# Patient Record
Sex: FEMALE | Race: BLACK OR AFRICAN AMERICAN | NOT HISPANIC OR LATINO | URBAN - METROPOLITAN AREA
[De-identification: names, ages, dates, MRNs, and addresses within clinical notes are randomized per-mention and may not be internally consistent; named-entity substitution may affect disease eponyms.]

---

## 2018-08-30 ENCOUNTER — WALK IN (OUTPATIENT)
Dept: URGENT CARE | Age: 18
End: 2018-08-30

## 2018-08-30 VITALS
OXYGEN SATURATION: 98 % | HEART RATE: 86 BPM | SYSTOLIC BLOOD PRESSURE: 120 MMHG | DIASTOLIC BLOOD PRESSURE: 80 MMHG | TEMPERATURE: 98.4 F

## 2018-08-30 DIAGNOSIS — T78.3XXA ANGIOEDEMA OF LIPS, INITIAL ENCOUNTER: Primary | ICD-10-CM

## 2018-08-30 PROCEDURE — 99214 OFFICE O/P EST MOD 30 MIN: CPT | Performed by: FAMILY MEDICINE

## 2018-08-30 RX ORDER — DIPHENHYDRAMINE HCL 25 MG
25 CAPSULE ORAL EVERY 6 HOURS PRN
COMMUNITY
End: 2023-09-20 | Stop reason: ALTCHOICE

## 2018-08-30 RX ORDER — PREDNISONE 50 MG/1
50 TABLET ORAL DAILY
COMMUNITY
End: 2023-09-20 | Stop reason: ALTCHOICE

## 2018-08-30 RX ORDER — CETIRIZINE HYDROCHLORIDE 10 MG/1
10 TABLET ORAL DAILY
Qty: 30 TABLET | Refills: 1 | Status: SHIPPED | OUTPATIENT
Start: 2018-08-30 | End: 2019-02-26

## 2023-07-21 LAB
HBV SURFACE AB SER QL: NEGATIVE
RUBV IGG SERPL IA-ACNC: NORMAL

## 2023-09-11 ENCOUNTER — TELEPHONE (OUTPATIENT)
Dept: MATERNAL FETAL MEDICINE | Age: 23
End: 2023-09-11

## 2023-09-11 DIAGNOSIS — O99.210 OBESITY AFFECTING PREGNANCY, ANTEPARTUM: ICD-10-CM

## 2023-09-11 DIAGNOSIS — O30.002 TWIN GESTATION IN SECOND TRIMESTER, UNSPECIFIED MULTIPLE GESTATION TYPE: Primary | ICD-10-CM

## 2023-09-11 LAB
HIV 1+2 AB+HIV1 P24 AG SERPL QL IA: NONREACTIVE
RPR SER QL: NONREACTIVE

## 2023-09-19 PROBLEM — O99.210 OBESITY AFFECTING PREGNANCY, ANTEPARTUM: Status: ACTIVE | Noted: 2023-09-19

## 2023-09-19 PROBLEM — O09.30 LATE PRENATAL CARE: Status: ACTIVE | Noted: 2023-09-19

## 2023-09-19 PROBLEM — O09.299 HISTORY OF PRE-ECLAMPSIA IN PRIOR PREGNANCY, CURRENTLY PREGNANT: Status: ACTIVE | Noted: 2023-09-19

## 2023-09-19 PROBLEM — Z86.19 HISTORY OF TRICHOMONIASIS: Status: ACTIVE | Noted: 2023-09-19

## 2023-09-19 PROBLEM — O99.019 ANTEPARTUM ANEMIA: Status: ACTIVE | Noted: 2023-09-19

## 2023-09-19 PROBLEM — O30.009 TWIN GESTATION WITH UNKNOWN NUMBER OF PLACENTAS AND AMNIOTIC SACS, ANTEPARTUM: Status: ACTIVE | Noted: 2023-09-19

## 2023-09-20 ENCOUNTER — OFFICE VISIT (OUTPATIENT)
Dept: MATERNAL FETAL MEDICINE | Age: 23
End: 2023-09-20

## 2023-09-20 VITALS
HEART RATE: 95 BPM | WEIGHT: 293 LBS | HEIGHT: 65 IN | DIASTOLIC BLOOD PRESSURE: 84 MMHG | BODY MASS INDEX: 48.82 KG/M2 | SYSTOLIC BLOOD PRESSURE: 133 MMHG | RESPIRATION RATE: 18 BRPM | OXYGEN SATURATION: 99 %

## 2023-09-20 DIAGNOSIS — O09.299 HISTORY OF PRE-ECLAMPSIA IN PRIOR PREGNANCY, CURRENTLY PREGNANT: ICD-10-CM

## 2023-09-20 DIAGNOSIS — O99.210 OBESITY AFFECTING PREGNANCY, ANTEPARTUM: ICD-10-CM

## 2023-09-20 DIAGNOSIS — O30.002 TWIN GESTATION IN SECOND TRIMESTER, UNSPECIFIED MULTIPLE GESTATION TYPE: ICD-10-CM

## 2023-09-20 DIAGNOSIS — O99.019 ANTEPARTUM ANEMIA: ICD-10-CM

## 2023-09-20 DIAGNOSIS — O30.009 TWIN GESTATION WITH UNKNOWN NUMBER OF PLACENTAS AND AMNIOTIC SACS, ANTEPARTUM: ICD-10-CM

## 2023-09-20 DIAGNOSIS — Z36.3 ANTENATAL SCREENING FOR MALFORMATION USING ULTRASONICS: Primary | ICD-10-CM

## 2023-09-20 DIAGNOSIS — Z3A.31 31 WEEKS GESTATION OF PREGNANCY: Primary | ICD-10-CM

## 2023-09-20 LAB
APPEARANCE, POC: CLEAR
BILIRUBIN, POC: ABNORMAL
COLOR, POC: YELLOW
GLUCOSE UR-MCNC: NEGATIVE MG/DL
KETONES, POC: ABNORMAL MG/DL
NITRITE, POC: NEGATIVE
OCCULT BLOOD, POC: NEGATIVE
PH UR: 7 [PH] (ref 5–7)
PROT UR-MCNC: ABNORMAL MG/DL
SP GR UR: 1.02 (ref 1–1.03)
UROBILINOGEN UR-MCNC: 0.2 MG/DL (ref 0–1)
WBC (LEUKOCYTE) ESTERASE, POC: ABNORMAL

## 2023-09-20 PROCEDURE — 81002 URINALYSIS NONAUTO W/O SCOPE: CPT | Performed by: OBSTETRICS & GYNECOLOGY

## 2023-09-20 PROCEDURE — 76812 OB US DETAILED ADDL FETUS: CPT | Performed by: OBSTETRICS & GYNECOLOGY

## 2023-09-20 PROCEDURE — 99244 OFF/OP CNSLTJ NEW/EST MOD 40: CPT | Performed by: OBSTETRICS & GYNECOLOGY

## 2023-09-20 PROCEDURE — 76811 OB US DETAILED SNGL FETUS: CPT | Performed by: OBSTETRICS & GYNECOLOGY

## 2023-09-20 RX ORDER — FERROUS SULFATE 325(65) MG
1 TABLET ORAL
COMMUNITY
Start: 2023-09-05

## 2023-09-20 ASSESSMENT — PAIN SCALES - GENERAL: PAINLEVEL: 0

## 2023-09-26 ENCOUNTER — TELEPHONE (OUTPATIENT)
Dept: MATERNAL FETAL MEDICINE | Age: 23
End: 2023-09-26

## 2023-09-26 DIAGNOSIS — O99.210 OBESITY AFFECTING PREGNANCY, ANTEPARTUM: ICD-10-CM

## 2023-09-26 DIAGNOSIS — O30.049 DICHORIONIC DIAMNIOTIC TWIN PREGNANCY, ANTEPARTUM: Primary | ICD-10-CM

## 2023-10-11 ENCOUNTER — TELEPHONE (OUTPATIENT)
Dept: MATERNAL FETAL MEDICINE | Age: 23
End: 2023-10-11

## 2023-10-11 ENCOUNTER — TELEPHONE (OUTPATIENT)
Dept: HEMATOLOGY/ONCOLOGY | Age: 23
End: 2023-10-11

## 2023-10-11 DIAGNOSIS — E66.9 OBESITY: Primary | ICD-10-CM

## 2023-10-11 PROBLEM — O99.013 ANEMIA IN PREGNANCY, THIRD TRIMESTER: Status: ACTIVE | Noted: 2023-09-19

## 2023-10-12 ENCOUNTER — TELEPHONE (OUTPATIENT)
Dept: MATERNAL FETAL MEDICINE | Age: 23
End: 2023-10-12

## 2023-10-27 ENCOUNTER — E-ADVICE (OUTPATIENT)
Dept: MATERNAL FETAL MEDICINE | Age: 23
End: 2023-10-27

## 2023-10-30 ENCOUNTER — TELEPHONE (OUTPATIENT)
Dept: MATERNAL FETAL MEDICINE | Age: 23
End: 2023-10-30

## 2023-11-08 ENCOUNTER — HOSPITAL ENCOUNTER (INPATIENT)
Age: 23
LOS: 3 days | Discharge: HOME | End: 2023-11-11
Attending: OBSTETRICS & GYNECOLOGY | Admitting: OBSTETRICS & GYNECOLOGY

## 2023-11-08 ENCOUNTER — APPOINTMENT (OUTPATIENT)
Dept: ULTRASOUND IMAGING | Age: 23
End: 2023-11-08
Attending: OBSTETRICS & GYNECOLOGY

## 2023-11-08 ENCOUNTER — ANESTHESIA EVENT (OUTPATIENT)
Dept: OBGYN | Age: 23
End: 2023-11-08

## 2023-11-08 ENCOUNTER — ANESTHESIA (OUTPATIENT)
Dept: OBGYN | Age: 23
End: 2023-11-08

## 2023-11-08 DIAGNOSIS — Z3A.38 38 WEEKS GESTATION OF PREGNANCY: ICD-10-CM

## 2023-11-08 LAB
ABO + RH BLD: NORMAL
ALBUMIN SERPL-MCNC: 2.5 G/DL (ref 3.6–5.1)
ALBUMIN/GLOB SERPL: 0.5 {RATIO} (ref 1–2.4)
ALP SERPL-CCNC: 511 UNITS/L (ref 45–117)
ALT SERPL-CCNC: 8 UNITS/L
ANION GAP SERPL CALC-SCNC: 15 MMOL/L (ref 7–19)
AST SERPL-CCNC: 16 UNITS/L
BASOPHILS # BLD: 0 K/MCL (ref 0–0.3)
BASOPHILS NFR BLD: 0 %
BILIRUB SERPL-MCNC: 0.5 MG/DL (ref 0.2–1)
BLD GP AB SCN SERPL QL GEL: POSITIVE
BUN SERPL-MCNC: 6 MG/DL (ref 6–20)
BUN/CREAT SERPL: 10 (ref 7–25)
CALCIUM SERPL-MCNC: 8.8 MG/DL (ref 8.4–10.2)
CHLORIDE SERPL-SCNC: 102 MMOL/L (ref 97–110)
CO2 SERPL-SCNC: 25 MMOL/L (ref 21–32)
CREAT SERPL-MCNC: 0.63 MG/DL (ref 0.51–0.95)
DEPRECATED RDW RBC: 44.1 FL (ref 39–50)
EGFRCR SERPLBLD CKD-EPI 2021: >90 ML/MIN/{1.73_M2}
EOSINOPHIL # BLD: 0.2 K/MCL (ref 0–0.5)
EOSINOPHIL NFR BLD: 1 %
ERYTHROCYTE [DISTWIDTH] IN BLOOD: 15.2 % (ref 11–15)
FASTING DURATION TIME PATIENT: ABNORMAL H
GLOBULIN SER-MCNC: 4.6 G/DL (ref 2–4)
GLUCOSE SERPL-MCNC: 94 MG/DL (ref 70–99)
HCT VFR BLD CALC: 32.3 % (ref 36–46.5)
HGB BLD-MCNC: 10.1 G/DL (ref 12–15.5)
HIV 1+2 AB+HIV1P24 AG SERPLBLD IA.RAPID: NONREACTIVE
IMM GRANULOCYTES # BLD AUTO: 0.1 K/MCL (ref 0–0.2)
IMM GRANULOCYTES # BLD: 1 %
LDH SERPL L TO P-CCNC: 284 UNITS/L (ref 82–240)
LYMPHOCYTES # BLD: 3.5 K/MCL (ref 1–4.8)
LYMPHOCYTES NFR BLD: 24 %
MCH RBC QN AUTO: 25.3 PG (ref 26–34)
MCHC RBC AUTO-ENTMCNC: 31.3 G/DL (ref 32–36.5)
MCV RBC AUTO: 80.8 FL (ref 78–100)
MONOCYTES # BLD: 1.2 K/MCL (ref 0.3–0.9)
MONOCYTES NFR BLD: 8 %
NEUTROPHILS # BLD: 9.7 K/MCL (ref 1.8–7.7)
NEUTROPHILS NFR BLD: 66 %
NRBC BLD MANUAL-RTO: 0 /100 WBC
PLATELET # BLD AUTO: 209 K/MCL (ref 140–450)
POTASSIUM SERPL-SCNC: 4 MMOL/L (ref 3.4–5.1)
PROT SERPL-MCNC: 7.1 G/DL (ref 6.4–8.2)
RBC # BLD: 4 MIL/MCL (ref 4–5.2)
SODIUM SERPL-SCNC: 138 MMOL/L (ref 135–145)
TYPE AND SCREEN EXPIRATION DATE: NORMAL
URATE SERPL-MCNC: 4.6 MG/DL (ref 2.6–5.9)
WBC # BLD: 14.7 K/MCL (ref 4.2–11)

## 2023-11-08 PROCEDURE — 83615 LACTATE (LD) (LDH) ENZYME: CPT | Performed by: OBSTETRICS & GYNECOLOGY

## 2023-11-08 PROCEDURE — 10002800 HB RX 250 W HCPCS: Performed by: ANESTHESIOLOGY

## 2023-11-08 PROCEDURE — 76815 OB US LIMITED FETUS(S): CPT

## 2023-11-08 PROCEDURE — 59514 CESAREAN DELIVERY ONLY: CPT | Performed by: OBSTETRICS & GYNECOLOGY

## 2023-11-08 PROCEDURE — 10000003 HB ROOM CHARGE WOMEN'S HEALTH

## 2023-11-08 PROCEDURE — 10004452 HB PACU ADDL 30 MINUTES: Performed by: OBSTETRICS & GYNECOLOGY

## 2023-11-08 PROCEDURE — 86870 RBC ANTIBODY IDENTIFICATION: CPT | Performed by: OBSTETRICS & GYNECOLOGY

## 2023-11-08 PROCEDURE — 10002807 HB RX 258: Performed by: ANESTHESIOLOGY

## 2023-11-08 PROCEDURE — 80053 COMPREHEN METABOLIC PANEL: CPT | Performed by: OBSTETRICS & GYNECOLOGY

## 2023-11-08 PROCEDURE — 10004651 HB RX, NO CHARGE ITEM: Performed by: OBSTETRICS & GYNECOLOGY

## 2023-11-08 PROCEDURE — 87340 HEPATITIS B SURFACE AG IA: CPT | Performed by: OBSTETRICS & GYNECOLOGY

## 2023-11-08 PROCEDURE — 10004451 HB PACU RECOVERY 1ST 30 MINUTES: Performed by: OBSTETRICS & GYNECOLOGY

## 2023-11-08 PROCEDURE — 13000001 HB PHASE II RECOVERY EA 30 MINUTES: Performed by: OBSTETRICS & GYNECOLOGY

## 2023-11-08 PROCEDURE — 86850 RBC ANTIBODY SCREEN: CPT | Performed by: OBSTETRICS & GYNECOLOGY

## 2023-11-08 PROCEDURE — 10002801 HB RX 250 W/O HCPCS: Performed by: ANESTHESIOLOGY

## 2023-11-08 PROCEDURE — 84550 ASSAY OF BLOOD/URIC ACID: CPT | Performed by: OBSTETRICS & GYNECOLOGY

## 2023-11-08 PROCEDURE — 86592 SYPHILIS TEST NON-TREP QUAL: CPT | Performed by: OBSTETRICS & GYNECOLOGY

## 2023-11-08 PROCEDURE — 13000036 HB COMPLEX  CASE S/U + 1ST 15 MIN: Performed by: OBSTETRICS & GYNECOLOGY

## 2023-11-08 PROCEDURE — 85025 COMPLETE CBC W/AUTO DIFF WBC: CPT | Performed by: OBSTETRICS & GYNECOLOGY

## 2023-11-08 PROCEDURE — 88307 TISSUE EXAM BY PATHOLOGIST: CPT | Performed by: OBSTETRICS & GYNECOLOGY

## 2023-11-08 PROCEDURE — 87806 HIV AG W/HIV1&2 ANTB W/OPTIC: CPT | Performed by: OBSTETRICS & GYNECOLOGY

## 2023-11-08 PROCEDURE — 86762 RUBELLA ANTIBODY: CPT | Performed by: OBSTETRICS & GYNECOLOGY

## 2023-11-08 PROCEDURE — 13001456 HB PERINATAL CARE

## 2023-11-08 PROCEDURE — 10002807 HB RX 258: Performed by: OBSTETRICS & GYNECOLOGY

## 2023-11-08 PROCEDURE — 10002800 HB RX 250 W HCPCS: Performed by: OBSTETRICS & GYNECOLOGY

## 2023-11-08 PROCEDURE — 13000005 HB ANESTHESIA  GENERAL IN L&D: Performed by: OBSTETRICS & GYNECOLOGY

## 2023-11-08 PROCEDURE — 10006023 HB SUPPLY 272: Performed by: OBSTETRICS & GYNECOLOGY

## 2023-11-08 PROCEDURE — 10002803 HB RX 637: Performed by: OBSTETRICS & GYNECOLOGY

## 2023-11-08 PROCEDURE — 13000037 HB COMPLEX CASE EACH ADD MINUTE: Performed by: OBSTETRICS & GYNECOLOGY

## 2023-11-08 RX ORDER — HYDRALAZINE HYDROCHLORIDE 20 MG/ML
10 INJECTION INTRAMUSCULAR; INTRAVENOUS ONCE
Status: DISCONTINUED | OUTPATIENT
Start: 2023-11-08 | End: 2023-11-11 | Stop reason: HOSPADM

## 2023-11-08 RX ORDER — SODIUM CHLORIDE, SODIUM LACTATE, POTASSIUM CHLORIDE, CALCIUM CHLORIDE 600; 310; 30; 20 MG/100ML; MG/100ML; MG/100ML; MG/100ML
INJECTION, SOLUTION INTRAVENOUS CONTINUOUS
Status: DISCONTINUED | OUTPATIENT
Start: 2023-11-08 | End: 2023-11-11 | Stop reason: HOSPADM

## 2023-11-08 RX ORDER — CALCIUM CARBONATE 500 MG/1
500 TABLET, CHEWABLE ORAL EVERY 4 HOURS PRN
Status: DISCONTINUED | OUTPATIENT
Start: 2023-11-08 | End: 2023-11-11 | Stop reason: HOSPADM

## 2023-11-08 RX ORDER — AMOXICILLIN 250 MG
2 CAPSULE ORAL DAILY PRN
Status: DISCONTINUED | OUTPATIENT
Start: 2023-11-08 | End: 2023-11-11 | Stop reason: HOSPADM

## 2023-11-08 RX ORDER — PROCHLORPERAZINE MALEATE 5 MG/1
5 TABLET ORAL EVERY 4 HOURS PRN
Status: DISCONTINUED | OUTPATIENT
Start: 2023-11-08 | End: 2023-11-11 | Stop reason: HOSPADM

## 2023-11-08 RX ORDER — IBUPROFEN 600 MG/1
600 TABLET ORAL EVERY 6 HOURS SCHEDULED
Status: DISCONTINUED | OUTPATIENT
Start: 2023-11-09 | End: 2023-11-11 | Stop reason: HOSPADM

## 2023-11-08 RX ORDER — 0.9 % SODIUM CHLORIDE 0.9 %
2 VIAL (ML) INJECTION EVERY 12 HOURS SCHEDULED
Status: DISCONTINUED | OUTPATIENT
Start: 2023-11-08 | End: 2023-11-11 | Stop reason: HOSPADM

## 2023-11-08 RX ORDER — LIDOCAINE HYDROCHLORIDE 10 MG/ML
INJECTION, SOLUTION EPIDURAL; INFILTRATION; INTRACAUDAL; PERINEURAL
Status: COMPLETED | OUTPATIENT
Start: 2023-11-08 | End: 2023-11-08

## 2023-11-08 RX ORDER — METOCLOPRAMIDE HYDROCHLORIDE 5 MG/ML
INJECTION INTRAMUSCULAR; INTRAVENOUS PRN
Status: DISCONTINUED | OUTPATIENT
Start: 2023-11-08 | End: 2023-11-08

## 2023-11-08 RX ORDER — ONDANSETRON 2 MG/ML
4 INJECTION INTRAMUSCULAR; INTRAVENOUS 2 TIMES DAILY PRN
Status: ACTIVE | OUTPATIENT
Start: 2023-11-08 | End: 2023-11-10

## 2023-11-08 RX ORDER — PROPOFOL 10 MG/ML
INJECTION, EMULSION INTRAVENOUS PRN
Status: DISCONTINUED | OUTPATIENT
Start: 2023-11-08 | End: 2023-11-08

## 2023-11-08 RX ORDER — ACETAMINOPHEN 325 MG/1
650 TABLET ORAL EVERY 4 HOURS PRN
Status: DISCONTINUED | OUTPATIENT
Start: 2023-11-08 | End: 2023-11-11 | Stop reason: HOSPADM

## 2023-11-08 RX ORDER — SODIUM CHLORIDE 9 MG/ML
INJECTION, SOLUTION INTRAVENOUS CONTINUOUS PRN
Status: DISCONTINUED | OUTPATIENT
Start: 2023-11-08 | End: 2023-11-08

## 2023-11-08 RX ORDER — ACETAMINOPHEN 325 MG/1
650 TABLET ORAL EVERY 6 HOURS SCHEDULED
Status: DISCONTINUED | OUTPATIENT
Start: 2023-11-09 | End: 2023-11-11 | Stop reason: HOSPADM

## 2023-11-08 RX ORDER — HYDROCORTISONE ACETATE PRAMOXINE HCL 2.5; 1 G/100G; G/100G
1 CREAM TOPICAL 3 TIMES DAILY PRN
Status: DISCONTINUED | OUTPATIENT
Start: 2023-11-08 | End: 2023-11-11 | Stop reason: HOSPADM

## 2023-11-08 RX ORDER — PROCHLORPERAZINE EDISYLATE 5 MG/ML
5 INJECTION INTRAMUSCULAR; INTRAVENOUS EVERY 4 HOURS PRN
Status: DISCONTINUED | OUTPATIENT
Start: 2023-11-08 | End: 2023-11-11 | Stop reason: HOSPADM

## 2023-11-08 RX ORDER — OXYTOCIN-SODIUM CHLORIDE 0.9% IV SOLN 30 UNIT/500ML 30-0.9/5 UT/ML-%
0-334 SOLUTION INTRAVENOUS CONTINUOUS
Status: DISCONTINUED | OUTPATIENT
Start: 2023-11-08 | End: 2023-11-08 | Stop reason: SDUPTHER

## 2023-11-08 RX ORDER — LIDOCAINE HYDROCHLORIDE 10 MG/ML
30 INJECTION, SOLUTION EPIDURAL; INFILTRATION; INTRACAUDAL; PERINEURAL
Status: DISCONTINUED | OUTPATIENT
Start: 2023-11-08 | End: 2023-11-11 | Stop reason: HOSPADM

## 2023-11-08 RX ORDER — CALCIUM CARBONATE 500 MG/1
500 TABLET, CHEWABLE ORAL EVERY 4 HOURS PRN
Status: DISCONTINUED | OUTPATIENT
Start: 2023-11-08 | End: 2023-11-08

## 2023-11-08 RX ORDER — SIMETHICONE 125 MG
125 TABLET,CHEWABLE ORAL 4 TIMES DAILY PRN
Status: DISCONTINUED | OUTPATIENT
Start: 2023-11-08 | End: 2023-11-11 | Stop reason: HOSPADM

## 2023-11-08 RX ORDER — NALOXONE HCL 0.4 MG/ML
0.2 VIAL (ML) INJECTION EVERY 5 MIN PRN
Status: DISCONTINUED | OUTPATIENT
Start: 2023-11-08 | End: 2023-11-11 | Stop reason: HOSPADM

## 2023-11-08 RX ORDER — ONDANSETRON 2 MG/ML
4 INJECTION INTRAMUSCULAR; INTRAVENOUS 2 TIMES DAILY PRN
Status: DISCONTINUED | OUTPATIENT
Start: 2023-11-08 | End: 2023-11-08

## 2023-11-08 RX ORDER — BUPIVACAINE HYDROCHLORIDE 2.5 MG/ML
INJECTION, SOLUTION EPIDURAL; INFILTRATION; INTRACAUDAL
Status: COMPLETED | OUTPATIENT
Start: 2023-11-08 | End: 2023-11-08

## 2023-11-08 RX ORDER — ONDANSETRON 2 MG/ML
INJECTION INTRAMUSCULAR; INTRAVENOUS PRN
Status: DISCONTINUED | OUTPATIENT
Start: 2023-11-08 | End: 2023-11-08

## 2023-11-08 RX ORDER — DEXAMETHASONE SODIUM PHOSPHATE 4 MG/ML
INJECTION, SOLUTION INTRA-ARTICULAR; INTRALESIONAL; INTRAMUSCULAR; INTRAVENOUS; SOFT TISSUE PRN
Status: DISCONTINUED | OUTPATIENT
Start: 2023-11-08 | End: 2023-11-08

## 2023-11-08 RX ORDER — FERROUS SULFATE 325(65) MG
325 TABLET ORAL
Status: DISCONTINUED | OUTPATIENT
Start: 2023-11-09 | End: 2023-11-11 | Stop reason: HOSPADM

## 2023-11-08 RX ORDER — VITAMIN A, VITAMIN C, VITAMIN D-3, VITAMIN E, VITAMIN B-1, VITAMIN B-2, NIACIN, VITAMIN B-6, CALCIUM, IRON, ZINC, COPPER 4000; 120; 400; 22; 1.84; 3; 20; 10; 1; 12; 200; 27; 25; 2 [IU]/1; MG/1; [IU]/1; MG/1; MG/1; MG/1; MG/1; MG/1; MG/1; UG/1; MG/1; MG/1; MG/1; MG/1
1 TABLET ORAL DAILY
Status: DISCONTINUED | OUTPATIENT
Start: 2023-11-09 | End: 2023-11-11 | Stop reason: HOSPADM

## 2023-11-08 RX ORDER — OXYTOCIN-SODIUM CHLORIDE 0.9% IV SOLN 30 UNIT/500ML 30-0.9/5 UT/ML-%
0-334 SOLUTION INTRAVENOUS CONTINUOUS
Status: DISCONTINUED | OUTPATIENT
Start: 2023-11-08 | End: 2023-11-11 | Stop reason: HOSPADM

## 2023-11-08 RX ORDER — ACETAMINOPHEN 650 MG/1
650 SUPPOSITORY RECTAL EVERY 4 HOURS PRN
Status: DISCONTINUED | OUTPATIENT
Start: 2023-11-08 | End: 2023-11-11 | Stop reason: HOSPADM

## 2023-11-08 RX ORDER — NALBUPHINE HYDROCHLORIDE 10 MG/ML
2.5 INJECTION, SOLUTION INTRAMUSCULAR; INTRAVENOUS; SUBCUTANEOUS
Status: DISCONTINUED | OUTPATIENT
Start: 2023-11-08 | End: 2023-11-11 | Stop reason: HOSPADM

## 2023-11-08 RX ORDER — KETOROLAC TROMETHAMINE 30 MG/ML
15 INJECTION, SOLUTION INTRAMUSCULAR; INTRAVENOUS EVERY 6 HOURS
Status: COMPLETED | OUTPATIENT
Start: 2023-11-08 | End: 2023-11-09

## 2023-11-08 RX ORDER — OXYTOCIN 10 [USP'U]/ML
10 INJECTION, SOLUTION INTRAMUSCULAR; INTRAVENOUS
Status: DISCONTINUED | OUTPATIENT
Start: 2023-11-08 | End: 2023-11-11 | Stop reason: HOSPADM

## 2023-11-08 RX ORDER — NIFEDIPINE 30 MG/1
30 TABLET, EXTENDED RELEASE ORAL DAILY
Status: DISCONTINUED | OUTPATIENT
Start: 2023-11-08 | End: 2023-11-11 | Stop reason: HOSPADM

## 2023-11-08 RX ADMIN — Medication 150 MG: at 18:46

## 2023-11-08 RX ADMIN — FENTANYL CITRATE 100 MCG: 50 INJECTION INTRAMUSCULAR; INTRAVENOUS at 19:24

## 2023-11-08 RX ADMIN — BUPIVACAINE HYDROCHLORIDE 5 ML: 2.5 INJECTION, SOLUTION EPIDURAL; INFILTRATION; INTRACAUDAL at 18:20

## 2023-11-08 RX ADMIN — NIFEDIPINE 30 MG: 30 TABLET, FILM COATED, EXTENDED RELEASE ORAL at 23:02

## 2023-11-08 RX ADMIN — SODIUM CHLORIDE: 9 INJECTION, SOLUTION INTRAVENOUS at 18:15

## 2023-11-08 RX ADMIN — SODIUM CHLORIDE: 9 INJECTION, SOLUTION INTRAVENOUS at 18:13

## 2023-11-08 RX ADMIN — KETOROLAC TROMETHAMINE 15 MG: 30 INJECTION, SOLUTION INTRAMUSCULAR; INTRAVENOUS at 23:01

## 2023-11-08 RX ADMIN — SODIUM CHLORIDE, POTASSIUM CHLORIDE, SODIUM LACTATE AND CALCIUM CHLORIDE 1000 ML: 600; 310; 30; 20 INJECTION, SOLUTION INTRAVENOUS at 17:56

## 2023-11-08 RX ADMIN — Medication 2 ML: at 23:23

## 2023-11-08 RX ADMIN — METOCLOPRAMIDE HYDROCHLORIDE 10 MG: 5 INJECTION INTRAMUSCULAR; INTRAVENOUS at 18:13

## 2023-11-08 RX ADMIN — SODIUM CHLORIDE, POTASSIUM CHLORIDE, SODIUM LACTATE AND CALCIUM CHLORIDE: 600; 310; 30; 20 INJECTION, SOLUTION INTRAVENOUS at 22:58

## 2023-11-08 RX ADMIN — OXYTOCIN 334 MILLI-UNITS/MIN: 10 INJECTION, SOLUTION INTRAMUSCULAR; INTRAVENOUS at 18:56

## 2023-11-08 RX ADMIN — FENTANYL CITRATE 100 MCG: 50 INJECTION INTRAMUSCULAR; INTRAVENOUS at 19:03

## 2023-11-08 RX ADMIN — Medication 94 ML/HR: at 23:22

## 2023-11-08 RX ADMIN — BUPIVACAINE HYDROCHLORIDE 5 ML: 2.5 INJECTION, SOLUTION EPIDURAL; INFILTRATION; INTRACAUDAL at 18:17

## 2023-11-08 RX ADMIN — ONDANSETRON 4 MG: 2 INJECTION INTRAMUSCULAR; INTRAVENOUS at 18:13

## 2023-11-08 RX ADMIN — FENTANYL CITRATE 100 MCG: 50 INJECTION INTRAMUSCULAR; INTRAVENOUS at 19:42

## 2023-11-08 RX ADMIN — FENTANYL CITRATE 100 MCG: 50 INJECTION INTRAMUSCULAR; INTRAVENOUS at 19:08

## 2023-11-08 RX ADMIN — KETOROLAC TROMETHAMINE 15 MG: 30 INJECTION, SOLUTION INTRAMUSCULAR at 19:21

## 2023-11-08 RX ADMIN — CEFAZOLIN 3000 MG: 10 INJECTION, POWDER, FOR SOLUTION INTRAVENOUS at 18:47

## 2023-11-08 RX ADMIN — GENTAMICIN SULFATE 450 MG: 40 INJECTION, SOLUTION INTRAMUSCULAR; INTRAVENOUS at 23:01

## 2023-11-08 RX ADMIN — LIDOCAINE HYDROCHLORIDE 5 ML: 10 INJECTION, SOLUTION EPIDURAL; INFILTRATION; INTRACAUDAL; PERINEURAL at 18:17

## 2023-11-08 RX ADMIN — PROPOFOL 200 MG: 10 INJECTION, EMULSION INTRAVENOUS at 18:46

## 2023-11-08 RX ADMIN — DEXAMETHASONE SODIUM PHOSPHATE 4 MG: 4 INJECTION INTRA-ARTICULAR; INTRALESIONAL; INTRAMUSCULAR; INTRAVENOUS; SOFT TISSUE at 18:13

## 2023-11-08 SDOH — HEALTH STABILITY: PHYSICAL HEALTH: DO YOU HAVE DIFFICULTY DRESSING OR BATHING?: NO

## 2023-11-08 SDOH — SOCIAL STABILITY: SOCIAL NETWORK
HOW OFTEN DO YOU SEE OR TALK TO PEOPLE THAT YOU CARE ABOUT AND FEEL CLOSE TO? (FOR EXAMPLE: TALKING TO FRIENDS ON THE PHONE, VISITING FRIENDS OR FAMILY, GOING TO CHURCH OR CLUB MEETINGS): 3 TO 5 TIMES A WEEK

## 2023-11-08 SDOH — ECONOMIC STABILITY: HOUSING INSECURITY: WHAT IS YOUR LIVING SITUATION TODAY?: CHILDREN;SPOUSE

## 2023-11-08 SDOH — SOCIAL STABILITY: SOCIAL NETWORK: SUPPORT SYSTEMS: CHILDREN;SPOUSE;FAMILY MEMBERS

## 2023-11-08 SDOH — ECONOMIC STABILITY: HOUSING INSECURITY: WHAT IS YOUR LIVING SITUATION TODAY?: APARTMENT

## 2023-11-08 SDOH — ECONOMIC STABILITY: HOUSING INSECURITY: ARE YOU WORRIED ABOUT LOSING YOUR HOUSING?: NO

## 2023-11-08 SDOH — ECONOMIC STABILITY: TRANSPORTATION INSECURITY
IN THE PAST 12 MONTHS, HAS THE LACK OF TRANSPORTATION KEPT YOU FROM MEDICAL APPOINTMENTS OR FROM GETTING MEDICATIONS?: YES

## 2023-11-08 SDOH — ECONOMIC STABILITY: FOOD INSECURITY: HOW OFTEN IN THE PAST 12 MONTHS WERE YOU WORRIED OR STRESSED ABOUT HAVING ENOUGH MONEY TO BUY NUTRITIOUS MEALS?: NEVER

## 2023-11-08 SDOH — HEALTH STABILITY: PHYSICAL HEALTH: DO YOU HAVE SERIOUS DIFFICULTY WALKING OR CLIMBING STAIRS?: NO

## 2023-11-08 SDOH — HEALTH STABILITY: GENERAL
BECAUSE OF A PHYSICAL, MENTAL, OR EMOTIONAL CONDITION, DO YOU HAVE SERIOUS DIFFICULTY CONCENTRATING, REMEMBERING OR MAKING DECISIONS?: NO

## 2023-11-08 SDOH — ECONOMIC STABILITY: GENERAL
IN THE PAST YEAR, HAVE YOU OR ANY FAMILY MEMBERS YOU LIVE WITH BEEN UNABLE TO GET ANY OF THE FOLLOWING WHEN IT WAS REALLY NEEDED? CHECK ALL THAT APPLY.: MEDICINE OR ANY HEALTH CARE (MEDICAL, DENTAL, MENTAL HEALTH, VISION)

## 2023-11-08 SDOH — ECONOMIC STABILITY: TRANSPORTATION INSECURITY
IN THE PAST 12 MONTHS, HAS LACK OF TRANSPORTATION KEPT YOU FROM MEETINGS, WORK, OR FROM GETTING THINGS NEEDED FOR DAILY LIVING?: YES

## 2023-11-08 SDOH — HEALTH STABILITY: GENERAL: BECAUSE OF A PHYSICAL, MENTAL, OR EMOTIONAL CONDITION, DO YOU HAVE DIFFICULTY DOING ERRANDS ALONE?: NO

## 2023-11-08 ASSESSMENT — PAIN SCALES - GENERAL
PAINLEVEL_OUTOF10: 0
PAINLEVEL_OUTOF10: 0
PAINLEVEL_OUTOF10: 7

## 2023-11-08 ASSESSMENT — ACTIVITIES OF DAILY LIVING (ADL)
ADL_BEFORE_ADMISSION: INDEPENDENT
ADL_SHORT_OF_BREATH: NO
RECENT_DECLINE_ADL: NO
ADL_SCORE: 12

## 2023-11-08 ASSESSMENT — LIFESTYLE VARIABLES
ALCOHOL_USE_STATUS: NO OR LOW RISK WITH VALIDATED TOOL
HOW MANY STANDARD DRINKS CONTAINING ALCOHOL DO YOU HAVE ON A TYPICAL DAY: 0,1 OR 2
HOW OFTEN DO YOU HAVE A DRINK CONTAINING ALCOHOL: MONTHLY OR LESS
HOW OFTEN DO YOU HAVE 6 OR MORE DRINKS ON ONE OCCASION: NEVER
AUDIT-C TOTAL SCORE: 1

## 2023-11-09 LAB
ABO + RH BLD: NORMAL
ABO + RH BLD: NORMAL
APPEARANCE UR: CLEAR
BACTERIA #/AREA URNS HPF: ABNORMAL /HPF
BASOPHILS # BLD: 0 K/MCL (ref 0–0.3)
BASOPHILS NFR BLD: 0 %
BILIRUB UR QL STRIP: NEGATIVE
BLD GP AB INVEST PLASRBC-IMP: NORMAL
BLOOD EXPIRATION DATE: NORMAL
BLOOD EXPIRATION DATE: NORMAL
COLOR UR: YELLOW
CROSSMATCH RESULT: NORMAL
CROSSMATCH RESULT: NORMAL
DEPRECATED RDW RBC: 45.1 FL (ref 39–50)
DISPENSE STATUS: NORMAL
DISPENSE STATUS: NORMAL
EOSINOPHIL # BLD: 0 K/MCL (ref 0–0.5)
EOSINOPHIL NFR BLD: 0 %
ERYTHROCYTE [DISTWIDTH] IN BLOOD: 15.3 % (ref 11–15)
GLUCOSE UR STRIP-MCNC: NEGATIVE MG/DL
HBV SURFACE AG SER QL: NEGATIVE
HCT VFR BLD CALC: 24.5 % (ref 36–46.5)
HGB BLD-MCNC: 7.6 G/DL (ref 12–15.5)
HGB UR QL STRIP: ABNORMAL
HYALINE CASTS #/AREA URNS LPF: ABNORMAL /LPF
IMM GRANULOCYTES # BLD AUTO: 0.2 K/MCL (ref 0–0.2)
IMM GRANULOCYTES # BLD: 1 %
ISBT BLOOD TYPE: 6200
ISBT BLOOD TYPE: 7300
KETONES UR STRIP-MCNC: ABNORMAL MG/DL
LDH SERPL L TO P-CCNC: 272 UNITS/L (ref 82–240)
LEUKOCYTE ESTERASE UR QL STRIP: ABNORMAL
LYMPHOCYTES # BLD: 2 K/MCL (ref 1–4.8)
LYMPHOCYTES NFR BLD: 8 %
MCH RBC QN AUTO: 25.5 PG (ref 26–34)
MCHC RBC AUTO-ENTMCNC: 31 G/DL (ref 32–36.5)
MCV RBC AUTO: 82.2 FL (ref 78–100)
MONOCYTES # BLD: 1.9 K/MCL (ref 0.3–0.9)
MONOCYTES NFR BLD: 8 %
MUCOUS THREADS URNS QL MICRO: PRESENT
NEUTROPHILS # BLD: 20.5 K/MCL (ref 1.8–7.7)
NEUTROPHILS NFR BLD: 83 %
NITRITE UR QL STRIP: NEGATIVE
NRBC BLD MANUAL-RTO: 0 /100 WBC
PATH REV BLD -IMP: NORMAL
PH UR STRIP: 6.5 [PH] (ref 5–7)
PLATELET # BLD AUTO: 171 K/MCL (ref 140–450)
PRODUCT CODE: NORMAL
PRODUCT CODE: NORMAL
PRODUCT DESCRIPTION: NORMAL
PRODUCT DESCRIPTION: NORMAL
PRODUCT ID: NORMAL
PRODUCT ID: NORMAL
PROT UR STRIP-MCNC: 30 MG/DL
RBC # BLD: 2.98 MIL/MCL (ref 4–5.2)
RBC #/AREA URNS HPF: >100 /HPF
RPR SER QL: NONREACTIVE
RUBV IGG SERPL IA-ACNC: 106.7 UNITS/ML
SERVICE CMNT-IMP: NORMAL
SP GR UR STRIP: >1.03 (ref 1–1.03)
SQUAMOUS #/AREA URNS HPF: ABNORMAL /HPF
TRANSFUSION SERVICES SAMPLE TRANSFER: NORMAL
UNIT BLOOD TYPE: NORMAL
UNIT BLOOD TYPE: NORMAL
UNIT NUMBER: NORMAL
UNIT NUMBER: NORMAL
URATE SERPL-MCNC: 4.5 MG/DL (ref 2.6–5.9)
UROBILINOGEN UR STRIP-MCNC: 0.2 MG/DL
WBC # BLD: 24.6 K/MCL (ref 4.2–11)
WBC #/AREA URNS HPF: ABNORMAL /HPF

## 2023-11-09 PROCEDURE — 86901 BLOOD TYPING SEROLOGIC RH(D): CPT

## 2023-11-09 PROCEDURE — 10002803 HB RX 637: Performed by: OBSTETRICS & GYNECOLOGY

## 2023-11-09 PROCEDURE — 87086 URINE CULTURE/COLONY COUNT: CPT | Performed by: OBSTETRICS & GYNECOLOGY

## 2023-11-09 PROCEDURE — 81001 URINALYSIS AUTO W/SCOPE: CPT | Performed by: OBSTETRICS & GYNECOLOGY

## 2023-11-09 PROCEDURE — 10002800 HB RX 250 W HCPCS: Performed by: OBSTETRICS & GYNECOLOGY

## 2023-11-09 PROCEDURE — 10002807 HB RX 258: Performed by: OBSTETRICS & GYNECOLOGY

## 2023-11-09 PROCEDURE — 36415 COLL VENOUS BLD VENIPUNCTURE: CPT | Performed by: OBSTETRICS & GYNECOLOGY

## 2023-11-09 PROCEDURE — 86920 COMPATIBILITY TEST SPIN: CPT

## 2023-11-09 PROCEDURE — 85025 COMPLETE CBC W/AUTO DIFF WBC: CPT | Performed by: OBSTETRICS & GYNECOLOGY

## 2023-11-09 PROCEDURE — 10000003 HB ROOM CHARGE WOMEN'S HEALTH

## 2023-11-09 RX ADMIN — AMPICILLIN 2000 MG: 2 INJECTION, POWDER, FOR SOLUTION INTRAMUSCULAR; INTRAVENOUS at 19:02

## 2023-11-09 RX ADMIN — KETOROLAC TROMETHAMINE 15 MG: 30 INJECTION, SOLUTION INTRAMUSCULAR; INTRAVENOUS at 13:16

## 2023-11-09 RX ADMIN — AMPICILLIN 2000 MG: 2 INJECTION, POWDER, FOR SOLUTION INTRAMUSCULAR; INTRAVENOUS at 05:53

## 2023-11-09 RX ADMIN — FERROUS SULFATE TAB 325 MG (65 MG ELEMENTAL FE) 325 MG: 325 (65 FE) TAB at 08:22

## 2023-11-09 RX ADMIN — AMPICILLIN 2000 MG: 2 INJECTION, POWDER, FOR SOLUTION INTRAMUSCULAR; INTRAVENOUS at 00:21

## 2023-11-09 RX ADMIN — AMPICILLIN 2000 MG: 2 INJECTION, POWDER, FOR SOLUTION INTRAMUSCULAR; INTRAVENOUS at 11:45

## 2023-11-09 RX ADMIN — NIFEDIPINE 30 MG: 30 TABLET, FILM COATED, EXTENDED RELEASE ORAL at 23:00

## 2023-11-09 RX ADMIN — GENTAMICIN SULFATE 450 MG: 40 INJECTION, SOLUTION INTRAMUSCULAR; INTRAVENOUS at 23:01

## 2023-11-09 RX ADMIN — VITAMIN A, VITAMIN C, VITAMIN D, VITAMIN E, THIAMINE, RIBOFLAVIN, NIACIN, VITAMIN B6, FOLIC ACID, VITAMIN B12, CALCIUM, IRON, ZINC, COPPER 1 TABLET: 4000; 120; 400; 22; 1.84; 3; 20; 10; 1; 12; 200; 27; 25; 2 TABLET ORAL at 08:22

## 2023-11-09 RX ADMIN — KETOROLAC TROMETHAMINE 15 MG: 30 INJECTION, SOLUTION INTRAMUSCULAR; INTRAVENOUS at 05:27

## 2023-11-09 ASSESSMENT — PAIN SCALES - GENERAL
PAINLEVEL_OUTOF10: 7
PAINLEVEL_OUTOF10: 3
PAINLEVEL_OUTOF10: 5
PAINLEVEL_OUTOF10: 5
PAINLEVEL_OUTOF10: 4
PAINLEVEL_OUTOF10: 0

## 2023-11-10 LAB — BACTERIA UR CULT: NO GROWTH

## 2023-11-10 PROCEDURE — 10002803 HB RX 637: Performed by: OBSTETRICS & GYNECOLOGY

## 2023-11-10 PROCEDURE — 10004651 HB RX, NO CHARGE ITEM: Performed by: OBSTETRICS & GYNECOLOGY

## 2023-11-10 PROCEDURE — 10002807 HB RX 258: Performed by: OBSTETRICS & GYNECOLOGY

## 2023-11-10 PROCEDURE — 10000003 HB ROOM CHARGE WOMEN'S HEALTH

## 2023-11-10 PROCEDURE — 10002800 HB RX 250 W HCPCS: Performed by: OBSTETRICS & GYNECOLOGY

## 2023-11-10 RX ADMIN — VITAMIN A, VITAMIN C, VITAMIN D, VITAMIN E, THIAMINE, RIBOFLAVIN, NIACIN, VITAMIN B6, FOLIC ACID, VITAMIN B12, CALCIUM, IRON, ZINC, COPPER 1 TABLET: 4000; 120; 400; 22; 1.84; 3; 20; 10; 1; 12; 200; 27; 25; 2 TABLET ORAL at 09:00

## 2023-11-10 RX ADMIN — FERROUS SULFATE TAB 325 MG (65 MG ELEMENTAL FE) 325 MG: 325 (65 FE) TAB at 09:00

## 2023-11-10 RX ADMIN — IBUPROFEN 600 MG: 600 TABLET, FILM COATED ORAL at 03:11

## 2023-11-10 RX ADMIN — IBUPROFEN 600 MG: 600 TABLET, FILM COATED ORAL at 19:58

## 2023-11-10 RX ADMIN — NIFEDIPINE 30 MG: 30 TABLET, FILM COATED, EXTENDED RELEASE ORAL at 19:59

## 2023-11-10 RX ADMIN — AMPICILLIN 2000 MG: 2 INJECTION, POWDER, FOR SOLUTION INTRAMUSCULAR; INTRAVENOUS at 02:07

## 2023-11-10 RX ADMIN — ACETAMINOPHEN 650 MG: 325 TABLET ORAL at 09:17

## 2023-11-10 ASSESSMENT — PAIN SCALES - GENERAL
PAINLEVEL_OUTOF10: 0
PAINLEVEL_OUTOF10: 0
PAINLEVEL_OUTOF10: 3
PAINLEVEL_OUTOF10: 7
PAINLEVEL_OUTOF10: 6
PAINLEVEL_OUTOF10: 7
PAINLEVEL_OUTOF10: 6

## 2023-11-11 VITALS
RESPIRATION RATE: 17 BRPM | BODY MASS INDEX: 51.91 KG/M2 | WEIGHT: 293 LBS | SYSTOLIC BLOOD PRESSURE: 138 MMHG | HEART RATE: 86 BPM | TEMPERATURE: 98.4 F | HEIGHT: 63 IN | DIASTOLIC BLOOD PRESSURE: 81 MMHG | OXYGEN SATURATION: 98 %

## 2023-11-11 PROCEDURE — 10002803 HB RX 637: Performed by: OBSTETRICS & GYNECOLOGY

## 2023-11-11 RX ORDER — NIFEDIPINE 30 MG/1
30 TABLET, EXTENDED RELEASE ORAL DAILY
Qty: 30 TABLET | Refills: 1 | Status: SHIPPED | OUTPATIENT
Start: 2023-11-11

## 2023-11-11 RX ORDER — IBUPROFEN 600 MG/1
600 TABLET ORAL EVERY 6 HOURS SCHEDULED
Qty: 30 TABLET | Refills: 0 | Status: SHIPPED | OUTPATIENT
Start: 2023-11-11

## 2023-11-11 RX ORDER — AMOXICILLIN 250 MG
2 CAPSULE ORAL DAILY PRN
Qty: 30 TABLET | Refills: 0 | Status: SHIPPED | OUTPATIENT
Start: 2023-11-11

## 2023-11-11 RX ADMIN — VITAMIN A, VITAMIN C, VITAMIN D, VITAMIN E, THIAMINE, RIBOFLAVIN, NIACIN, VITAMIN B6, FOLIC ACID, VITAMIN B12, CALCIUM, IRON, ZINC, COPPER 1 TABLET: 4000; 120; 400; 22; 1.84; 3; 20; 10; 1; 12; 200; 27; 25; 2 TABLET ORAL at 08:40

## 2023-11-11 RX ADMIN — IBUPROFEN 600 MG: 600 TABLET, FILM COATED ORAL at 08:39

## 2023-11-11 RX ADMIN — FERROUS SULFATE TAB 325 MG (65 MG ELEMENTAL FE) 325 MG: 325 (65 FE) TAB at 08:39

## 2023-11-11 ASSESSMENT — EDINBURGH POSTNATAL DEPRESSION SCALE (EPDS)
TOTAL SCORE: 0
I HAVE BEEN SO UNHAPPY THAT I HAVE BEEN CRYING: NO, NEVER
I HAVE FELT SAD OR MISERABLE: NO, NOT AT ALL
I HAVE BEEN SO UNHAPPY THAT I HAVE HAD DIFFICULTY SLEEPING: NOT AT ALL
I HAVE BLAMED MYSELF UNNECESSARILY WHEN THINGS WENT WRONG: NO, NEVER
THE THOUGHT OF HARMING MYSELF HAS OCCURRED TO ME: NEVER
I HAVE BEEN ABLE TO LAUGH AND SEE THE FUNNY SIDE OF THINGS: AS MUCH AS I ALWAYS COULD
I HAVE FELT SCARED OR PANICKY FOR NO GOOD REASON: NO, NOT AT ALL
I HAVE BEEN ANXIOUS OR WORRIED FOR NO GOOD REASON: NO, NOT AT ALL
I HAVE LOOKED FORWARD WITH ENJOYMENT TO THINGS: AS MUCH AS I EVER DID
THINGS HAVE BEEN GETTING ON TOP OF ME: NO, I HAVE BEEN COPING AS WELL AS EVER

## 2023-11-11 ASSESSMENT — PAIN SCALES - GENERAL
PAINLEVEL_OUTOF10: 0
PAINLEVEL_OUTOF10: 5

## 2023-11-11 ASSESSMENT — PATIENT HEALTH QUESTIONNAIRE - PHQ9: IS PATIENT ABLE TO COMPLETE PHQ2 OR PHQ9: YES

## 2023-11-13 LAB
ASR DISCLAIMER: NORMAL
CASE RPRT: NORMAL
CLINICAL INFO: NORMAL
PATH REPORT.FINAL DX SPEC: NORMAL
PATH REPORT.GROSS SPEC: NORMAL

## 2024-06-03 ENCOUNTER — HOSPITAL ENCOUNTER (EMERGENCY)
Facility: CLINIC | Age: 24
Discharge: HOME | End: 2024-06-03
Admitting: PHYSICIAN ASSISTANT
Payer: MEDICAID

## 2024-06-03 VITALS
OXYGEN SATURATION: 96 % | WEIGHT: 274 LBS | HEIGHT: 65 IN | TEMPERATURE: 97.8 F | RESPIRATION RATE: 16 BRPM | DIASTOLIC BLOOD PRESSURE: 86 MMHG | HEART RATE: 93 BPM | BODY MASS INDEX: 45.65 KG/M2 | SYSTOLIC BLOOD PRESSURE: 177 MMHG

## 2024-06-03 DIAGNOSIS — L05.01 PILONIDAL CYST WITH ABSCESS: ICD-10-CM

## 2024-06-03 PROCEDURE — 250N000013 HC RX MED GY IP 250 OP 250 PS 637: Performed by: PHYSICIAN ASSISTANT

## 2024-06-03 PROCEDURE — 99283 EMERGENCY DEPT VISIT LOW MDM: CPT | Mod: 25

## 2024-06-03 PROCEDURE — 10080 I&D PILONIDAL CYST SIMPLE: CPT

## 2024-06-03 RX ORDER — ACETAMINOPHEN 325 MG/1
975 TABLET ORAL ONCE
Status: COMPLETED | OUTPATIENT
Start: 2024-06-03 | End: 2024-06-03

## 2024-06-03 RX ORDER — DOXYCYCLINE 100 MG/1
100 CAPSULE ORAL 2 TIMES DAILY
Qty: 20 CAPSULE | Refills: 0 | Status: SHIPPED | OUTPATIENT
Start: 2024-06-03 | End: 2024-06-13

## 2024-06-03 RX ORDER — IBUPROFEN 600 MG/1
600 TABLET, FILM COATED ORAL ONCE
Status: COMPLETED | OUTPATIENT
Start: 2024-06-03 | End: 2024-06-03

## 2024-06-03 RX ADMIN — IBUPROFEN 600 MG: 600 TABLET ORAL at 09:55

## 2024-06-03 RX ADMIN — ACETAMINOPHEN 975 MG: 325 TABLET ORAL at 09:55

## 2024-06-03 ASSESSMENT — ACTIVITIES OF DAILY LIVING (ADL): ADLS_ACUITY_SCORE: 35

## 2024-06-03 ASSESSMENT — COLUMBIA-SUICIDE SEVERITY RATING SCALE - C-SSRS
6. HAVE YOU EVER DONE ANYTHING, STARTED TO DO ANYTHING, OR PREPARED TO DO ANYTHING TO END YOUR LIFE?: NO
1. IN THE PAST MONTH, HAVE YOU WISHED YOU WERE DEAD OR WISHED YOU COULD GO TO SLEEP AND NOT WAKE UP?: NO
2. HAVE YOU ACTUALLY HAD ANY THOUGHTS OF KILLING YOURSELF IN THE PAST MONTH?: NO

## 2024-06-03 NOTE — ED PROVIDER NOTES
EMERGENCY DEPARTMENT ENCOUNTER   NAME: Eden Childs ; AGE: 24 year old female ; YOB: 2000 ; MRN: [id number] ; PCP: No primary care provider on file.     Evaluation Date & Time: No admission date for patient encounter.    ED Provider: Nery Gipson PA-C    CHIEF COMPLAINT     Cyst      FINAL ASSESSMENT       ICD-10-CM    1. Pilonidal cyst with abscess  L05.01 Adult General Surg Referral          ED COURSE, MEDICAL DECISION MAKING, PLAN     ED course     9:19 AM I met with the patient, obtained history, performed an initial exam, and discussed options and plan for diagnostics and treatment here in the ED.  9:52 AM Performed I&D. Discussed discharge and follow up plans. RN to discharge.     ______________________________________________________________________    Eden Childs is a 24 year old female with no pertinent PMH presenting for a painful cyst just above her buttocks that was first noticed 2 days ago and has progressively worsened.     Exam reveals a 5cm in diameter area of skin erythema, induration, and fluctuance over the gluteal cleft. Very tender to the touch.     Interventions here included:   I&D of the pilonidal abscess as discussed below under procedures. Large amount of thin yellow purulent material expressed from the site.   Oral Ibuprofen and Tylenol. These were given shortly before discharge.     Vitals have improved by time of discharge. BP still elevated to 177/86. No evidence of end organ damage. Recommended PCP follow up. HR has come down to 93.    History and exam consistent with a pilonidal cyst with abscess.   Low suspicion for sepsis or perianal/perirectal abscess.   No red flag s/s present to suggest an acutely serious or life threatening condition that would warrant hospitalization.     Rx for Doxycycline 100mg bid x10 days sent to the pharmacy.   Recommended alternating Tylenol and ibuprofen. Heating pad/sitz bath. Regularly cleaning and bandage dressings.  "    Provided patient with a general surgery referral for definitive management.     Indications for re-evaluation in the ER discussed.     Patient understanding and agreeable with the plan and will discharge to home in good condition.     ______________________________________________________________________    *All pertinent lab & imaging studies independently reviewed. (See chart for details)   *Discussed the results of all the tests and plan with patient and family/guardians.   *The patient and/or family/guardian acknowledged understanding and was agreeable with the care plan.      HISTORY OF PRESENT ILLNESS   Patient information was obtained from: Patient   Use of Intrepreter: N/A     Eden Childs is a 24 year old female with no pertinent history who presents to the ED by private car for evaluation of a cyst.    Patient reports having a cyst above her buttocks since Saturday. She notes that the cyst has gotten bigger and more painful since. She also endorses chills and body aches.     She has had abscesses before, but never in this location.     No other complaints at this time.       MEDICAL HISTORY     No past medical history on file.    No past surgical history on file.    No family history on file.         doxycycline hyclate (VIBRAMYCIN) 100 MG capsule          PHYSICAL EXAM     First Vitals:  Patient Vitals for the past 24 hrs:   BP Temp Temp src Pulse Resp SpO2 Height Weight   06/03/24 1008 -- -- -- 93 -- -- -- --   06/03/24 1005 (!) 177/86 -- -- -- -- -- -- --   06/03/24 0914 (!) 192/113 97.8  F (36.6  C) Oral 103 16 96 % 1.651 m (5' 5\") 124.3 kg (274 lb)         PHYSICAL EXAM:   Constitutional: No acute distress.  Neuro: Awake and alert.   Psych: Calm and cooperative.  Eyes: PERRL. EOMI. Conjunctivae clear.   Cardio: . Adequate perfusion to extremities.   Pulmonary: Oxygenating well on RA. No labored breathing.  Abdomen: BS present. Soft and non-distended. No palpable pain.  Skin: 5cm area of " skin erythema, induration and fluctuance at the gluteal cleft. Very tender to the touch. Does not extend down to the perianal area.       RESULTS     LAB:  All pertinent labs reviewed and interpreted  Labs Ordered and Resulted from Time of ED Arrival to Time of ED Departure - No data to display    RADIOLOGY:  No orders to display       ECG:    NA      PROCEDURES     PROCEDURE: Incision and Drainage   INDICATIONS: Localized abscess   PROCEDURE PROVIDER: Nery Gipson PA-C   SITE: Gluteal cleft   MEDICATION: 10 mLs of 1% Lidocaine with epinephrine   NOTE: The area was prepped with chlorhexidine and draped off in the usual sterile fashion.  Local anesthetic was injected subcutaneously with anesthesia effects demonstrated prior to proceeding.  The area of maximal fluctuance was opened with a # 11 Blade (Sharp Point) using a Single Straight incision to allow for drainage.  The abscess was drained.  The abscess cavity was bluntly explored to separate any loculations. No Packing was placed into the abscess cavity.  A sterile dressing was placed over the area.   COMPLEXITY: Simple    Simple = single, furuncle, paronychia, superficial  Complex = multiple or abscess requiring probing, loculations, packing placement   COMPLICATIONS: Patient tolerated procedure well, without complication              MEDICAL DECISION MAKING:  Obtained supplemental history:Supplemental history obtained?: No  Reviewed external records: External records reviewed?: No  Care impacted by chronic illness:N/A  Care significantly affected by social determinants of health:N/A  Did you consider but not order tests?: Work up considered but not performed and documented in chart, if applicable  Did you interpret images independently?: Independent interpretation of ECG and images noted in documentation, when applicable.  Consultation discussion with other provider:Did you involve another provider (consultant, , pharmacy, etc.)?: No  Discharge. I  prescribed additional prescription strength medication(s) as charted. See documentation for any additional details.      FINAL IMPRESSION:    ICD-10-CM    1. Pilonidal cyst with abscess  L05.01 Adult General Surg Referral            MEDICATIONS GIVEN IN THE EMERGENCY DEPARTMENT:  Medications   ibuprofen (ADVIL/MOTRIN) tablet 600 mg (600 mg Oral $Given 6/3/24 0955)   acetaminophen (TYLENOL) tablet 975 mg (975 mg Oral $Given 6/3/24 0955)         NEW PRESCRIPTIONS STARTED AT TODAY'S ED VISIT:  Discharge Medication List as of 6/3/2024 10:02 AM        START taking these medications    Details   doxycycline hyclate (VIBRAMYCIN) 100 MG capsule Take 1 capsule (100 mg) by mouth 2 times daily for 10 days, Disp-20 capsule, R-0, E-Prescribe                  IRadha, am serving as a scribe to document services personally performed by Nery Gipson PA-C, based on my observation and the provider's statements to me. INery PA-C attest that Radha Thacker is acting in a scribe capacity, has observed my performance of the services and has documented them in accordance with my direction.     Some or all of this documentation has been completed using dictation software and mild grammatical errors may be present. Please contact me with any concerns regarding this.       Nery Gipson PA-C  Emergency Medicine   [facility] EMERGENCY ROOM       Nery Gipson PA-C  06/03/24 1010

## 2024-06-03 NOTE — ED TRIAGE NOTES
The patient presents to the ED with a cyst right above her butt that has been present since Saturday. She reports worsening pain. Denies any drainage or fever from the area. The patient reports severe pain when sitting. The patient did not take anything for pain management prior to arrival.

## 2024-06-03 NOTE — DISCHARGE INSTRUCTIONS
Take the antibiotic for the full course as prescribed.   Keep the area clean and dry.   Apply a dressing if the area is still open and oozing pus and/or blood.   Use warm compresses or warm sitz baths a couple of times a day.   Use Tylenol and Ibuprofen to help with pain.   Follow up with general surgery to discuss ongoing management.   Return to the ER for any new or worsening symptoms.     Your blood pressure was noted to be elevated here today.  There are many possible reasons for high blood pressure.  Sometimes blood pressure will increase during stressful situations, such as being anxious or in pain.  These temporary increases are not concerning.  When your blood pressure is persistently elevated over a long period of time however, you are at increased risk of having a stroke or heart attack.  Since your blood pressure was elevated today, I recommend monitoring and following up in your regular clinic to discuss management if it persists.

## 2024-07-15 ENCOUNTER — ANCILLARY PROCEDURE (OUTPATIENT)
Dept: ULTRASOUND IMAGING | Facility: CLINIC | Age: 24
End: 2024-07-15
Attending: STUDENT IN AN ORGANIZED HEALTH CARE EDUCATION/TRAINING PROGRAM

## 2024-07-15 ENCOUNTER — HOSPITAL ENCOUNTER (EMERGENCY)
Facility: CLINIC | Age: 24
Discharge: HOME | End: 2024-07-15
Attending: STUDENT IN AN ORGANIZED HEALTH CARE EDUCATION/TRAINING PROGRAM | Admitting: STUDENT IN AN ORGANIZED HEALTH CARE EDUCATION/TRAINING PROGRAM

## 2024-07-15 VITALS
HEIGHT: 64 IN | WEIGHT: 293 LBS | OXYGEN SATURATION: 100 % | TEMPERATURE: 97.7 F | HEART RATE: 84 BPM | SYSTOLIC BLOOD PRESSURE: 152 MMHG | BODY MASS INDEX: 50.02 KG/M2 | RESPIRATION RATE: 20 BRPM | DIASTOLIC BLOOD PRESSURE: 81 MMHG

## 2024-07-15 DIAGNOSIS — L05.91 PILONIDAL CYST: ICD-10-CM

## 2024-07-15 PROCEDURE — 99284 EMERGENCY DEPT VISIT MOD MDM: CPT | Mod: 25

## 2024-07-15 PROCEDURE — 10080 I&D PILONIDAL CYST SIMPLE: CPT

## 2024-07-15 PROCEDURE — 87205 SMEAR GRAM STAIN: CPT | Performed by: STUDENT IN AN ORGANIZED HEALTH CARE EDUCATION/TRAINING PROGRAM

## 2024-07-15 RX ORDER — DOXYCYCLINE 100 MG/1
100 CAPSULE ORAL 2 TIMES DAILY
Qty: 28 CAPSULE | Refills: 0 | Status: SHIPPED | OUTPATIENT
Start: 2024-07-15 | End: 2024-07-29

## 2024-07-15 ASSESSMENT — COLUMBIA-SUICIDE SEVERITY RATING SCALE - C-SSRS
1. IN THE PAST MONTH, HAVE YOU WISHED YOU WERE DEAD OR WISHED YOU COULD GO TO SLEEP AND NOT WAKE UP?: NO
2. HAVE YOU ACTUALLY HAD ANY THOUGHTS OF KILLING YOURSELF IN THE PAST MONTH?: NO
6. HAVE YOU EVER DONE ANYTHING, STARTED TO DO ANYTHING, OR PREPARED TO DO ANYTHING TO END YOUR LIFE?: NO

## 2024-07-15 ASSESSMENT — ACTIVITIES OF DAILY LIVING (ADL)
ADLS_ACUITY_SCORE: 35

## 2024-07-15 NOTE — Clinical Note
Eden Childs was seen and treated in our emergency department on 7/15/2024.  She may return to work on 07/17/2024.       If you have any questions or concerns, please don't hesitate to call.      Garcia Barth MD

## 2024-07-15 NOTE — ED TRIAGE NOTES
Patient had a pilonidal cyst in the past and waited before coming in, so she came in sooner.  Sxs started Sturday.  Has taken warm bath, no medications taken today before coming in.     Triage Assessment (Adult)       Row Name 07/15/24 0899          Triage Assessment    Airway WDL WDL        Respiratory WDL    Respiratory WDL WDL        Skin Circulation/Temperature WDL    Skin Circulation/Temperature WDL WDL        Cardiac WDL    Cardiac WDL WDL        Peripheral/Neurovascular WDL    Peripheral Neurovascular WDL WDL        Cognitive/Neuro/Behavioral WDL    Cognitive/Neuro/Behavioral WDL WDL

## 2024-07-15 NOTE — ED PROVIDER NOTES
EMERGENCY DEPARTMENT ENCOUNTER       ED Course & Medical Decision Making     9:00 AM I met with the patient to gather history and perform an initial exam.    Final Impression  24 year old female presents for evaluation of recurrence of pilonidal cyst.  Patient seen in this ED on 6/3/2024, had a pilonidal cyst with abscess that was drained, started on doxycycline x 10 days, general surgery referral made, though it does not look like patient has followed up with general surgery.  States that she took the antibiotics, tolerated them well, states that it improved for several weeks, though in the last 2 weeks or so it has increased in pain and size again.  Discussed why she had not followed up with surgery, she was unsure if she wanted to, was unsure how long it would take for her to come back, though now knowing that it comes back in about 6 weeks, she is much more on board with following up with surgery to get it permanently treated.  Discussed we can drain it again today, though will need to follow-up with surgery if she really wants it to be fixed and not return.  Bedside ultrasound shows 1.5 x 1.5 cm fluid collection, appears to be loculated abscess.  Incision and drainage done as documented below, packed, will discharge home with course of doxycycline as she tolerated it well previously, though will also send a culture at this time.  All questions answered, will discharge home    Prior to making a final disposition on this patient the results of patient's tests and other diagnostic studies were discussed with the patient. All questions were answered. Patient expressed understanding of the plan and was amenable to it.    Medical Decision Making    History:  Supplemental history from: none  External Record(s) reviewed as documented below;  6/3/2024, ED visit for pilonidal cyst abscess    Work Up:  Chart documentation includes differential considered and any EKGs or imaging independently interpreted by provider, where  "specified.  Considered administering antibiotics for: Pilonidal cyst    Complicating factors:  Care impacted by chronic illness: Mental Health  Care affected by social determinants of health: Access to Medical Care    Disposition considerations: Discharge. I prescribed additional prescription strength medication(s) as charted. See documentation for any additional details.      Medications - No data to display    New Prescriptions    DOXYCYCLINE HYCLATE (VIBRAMYCIN) 100 MG CAPSULE    Take 1 capsule (100 mg) by mouth 2 times daily for 14 days     Modified Medications    No medications on file     Final Impression     1. Pilonidal cyst        Chief Complaint     Chief Complaint   Patient presents with    Cyst       Pilonida         HPI     Eden Childs is a 24 year old female who presents for evaluation of cyst.    Patient reports that she had a pilonidal cyst recently where she was treated with doxycycline. The medication helped but it came back this Friday (7/12/2024). Says that it is painful and swelling. It also hurts to sleep. Says the cyst is not as big as her previous one.     I, Niki Walsh am serving as a scribe to document services personally performed by Dr. Garcia Barth MD, based on my observation and the provider's statements to me. I, Dr. Garcia Barth MD attest that Niki Walsh is acting in a scribe capacity, has observed my performance of the services and has documented them in accordance with my direction.    Physical Exam     BP (!) 152/81   Pulse 84   Temp 97.7  F (36.5  C) (Oral)   Resp 20   Ht 1.626 m (5' 4\")   Wt 133.4 kg (294 lb)   SpO2 100%   BMI 50.46 kg/m    Constitutional: Awake, alert, in no acute distress.  Head: Normocephalic, atraumatic.  ENT: Mucous membranes moist.  Eyes: Conjunctiva normal.  Respiratory: Respirations even, unlabored, in no acute respiratory distress.  Cardiovascular: Regular rate and rhythm. Good peripheral perfusion.  GI: Abdomen soft, " non-tender.  Musculoskeletal: Moves all 4 extremities equally.  Integument: Warm, dry.  3.5 x 3.5 cm area of induration and fluctuance at the top of the intergluteal cleft, exquisitely tender to touch, no active drainage at time of exam.  Neurologic: Alert & oriented x 3. Normal speech. Grossly normal motor and sensory function. No focal deficits noted.  Psychiatric: Normal mood    Labs & Imaging       PROCEDURE: Emergency Department Limited Bedside Screening Ultrasound   TYPE:    ED LIMITED BEDSIDE SCREENING US - SOFT TISSUE   INDICATIONS: Abscess evaluation   PROCEDURE PROVIDER:   Garcia Barth    WINDOW AND FINDINGS: 1.5 x 1.5 cm abscess with complex fluid collection just under the skin surface at the top of the intergluteal cleft   IMAGES SAVED AND STORED FOR ARCHIVE AND REVIEW: Yes         Procedures     PROCEDURE: Incision and Drainage   INDICATIONS: Localized abscess   PROCEDURE PROVIDER: Dr Garcia Barth   SITE: Top of the intergluteal cleft   MEDICATION: 5 mLs of 1% Lidocaine with epinephrine   NOTE: The area was prepped with chlorhexidine and draped off in the usual sterile fashion.  Local anesthetic was injected subcutaneously with anesthesia effects demonstrated prior to proceeding.  The area of maximal fluctuance was opened with a # 11 Blade (Sharp Point) using a Single Straight incision to allow for drainage.  The abscess was drained.  The abscess cavity was bluntly explored to separate any loculations. Plain Gauze was placed into the abscess cavity.  A sterile dressing was placed over the area.   COMPLEXITY: Complex     COMPLICATIONS: Patient tolerated procedure well, without complication          Garcia Barth MD  07/15/24 4848

## 2024-07-17 LAB
BACTERIA ABSC ANAEROBE+AEROBE CULT: NO GROWTH
GRAM STAIN RESULT: NORMAL
GRAM STAIN RESULT: NORMAL

## 2025-08-07 ENCOUNTER — LAB REQUISITION (OUTPATIENT)
Dept: LAB | Facility: CLINIC | Age: 25
End: 2025-08-07
Payer: COMMERCIAL

## 2025-08-07 DIAGNOSIS — Z12.4 ENCOUNTER FOR SCREENING FOR MALIGNANT NEOPLASM OF CERVIX: ICD-10-CM

## 2025-08-07 DIAGNOSIS — Z13.29 ENCOUNTER FOR SCREENING FOR OTHER SUSPECTED ENDOCRINE DISORDER: ICD-10-CM

## 2025-08-07 DIAGNOSIS — Z11.3 ENCOUNTER FOR SCREENING FOR INFECTIONS WITH A PREDOMINANTLY SEXUAL MODE OF TRANSMISSION: ICD-10-CM

## 2025-08-07 DIAGNOSIS — Z13.220 ENCOUNTER FOR SCREENING FOR LIPOID DISORDERS: ICD-10-CM

## 2025-08-07 DIAGNOSIS — Z13.1 ENCOUNTER FOR SCREENING FOR DIABETES MELLITUS: ICD-10-CM

## 2025-08-07 DIAGNOSIS — E66.01 MORBID (SEVERE) OBESITY DUE TO EXCESS CALORIES (H): ICD-10-CM

## 2025-08-07 LAB
ALBUMIN SERPL BCG-MCNC: 4.1 G/DL (ref 3.5–5.2)
ALBUMIN SERPL BCG-MCNC: 4.1 G/DL (ref 3.5–5.2)
ALP SERPL-CCNC: 57 U/L (ref 40–150)
ALP SERPL-CCNC: 57 U/L (ref 40–150)
ALT SERPL W P-5'-P-CCNC: 10 U/L (ref 0–50)
ALT SERPL W P-5'-P-CCNC: 10 U/L (ref 0–50)
ANION GAP SERPL CALCULATED.3IONS-SCNC: 10 MMOL/L (ref 7–15)
ANION GAP SERPL CALCULATED.3IONS-SCNC: 10 MMOL/L (ref 7–15)
AST SERPL W P-5'-P-CCNC: 19 U/L (ref 0–45)
AST SERPL W P-5'-P-CCNC: 19 U/L (ref 0–45)
BILIRUB SERPL-MCNC: 0.3 MG/DL
BILIRUB SERPL-MCNC: 0.3 MG/DL
BUN SERPL-MCNC: 8.6 MG/DL (ref 6–20)
BUN SERPL-MCNC: 8.6 MG/DL (ref 6–20)
CALCIUM SERPL-MCNC: 9.2 MG/DL (ref 8.8–10.4)
CALCIUM SERPL-MCNC: 9.2 MG/DL (ref 8.8–10.4)
CHLORIDE SERPL-SCNC: 106 MMOL/L (ref 98–107)
CHLORIDE SERPL-SCNC: 106 MMOL/L (ref 98–107)
CHOLEST SERPL-MCNC: 150 MG/DL
CHOLEST SERPL-MCNC: 150 MG/DL
CREAT SERPL-MCNC: 0.72 MG/DL (ref 0.51–0.95)
CREAT SERPL-MCNC: 0.72 MG/DL (ref 0.51–0.95)
EGFRCR SERPLBLD CKD-EPI 2021: >90 ML/MIN/1.73M2
EGFRCR SERPLBLD CKD-EPI 2021: >90 ML/MIN/1.73M2
ERYTHROCYTE [DISTWIDTH] IN BLOOD BY AUTOMATED COUNT: 14.4 % (ref 10–15)
ERYTHROCYTE [DISTWIDTH] IN BLOOD BY AUTOMATED COUNT: 14.4 % (ref 10–15)
EST. AVERAGE GLUCOSE BLD GHB EST-MCNC: 120 MG/DL
EST. AVERAGE GLUCOSE BLD GHB EST-MCNC: 120 MG/DL
FASTING STATUS PATIENT QL REPORTED: ABNORMAL
FASTING STATUS PATIENT QL REPORTED: ABNORMAL
FASTING STATUS PATIENT QL REPORTED: NORMAL
FASTING STATUS PATIENT QL REPORTED: NORMAL
GLUCOSE SERPL-MCNC: 84 MG/DL (ref 70–99)
GLUCOSE SERPL-MCNC: 84 MG/DL (ref 70–99)
HBA1C MFR BLD: 5.8 %
HBA1C MFR BLD: 5.8 %
HBV SURFACE AG SERPL QL IA: NONREACTIVE
HBV SURFACE AG SERPL QL IA: NONREACTIVE
HCO3 SERPL-SCNC: 25 MMOL/L (ref 22–29)
HCO3 SERPL-SCNC: 25 MMOL/L (ref 22–29)
HCT VFR BLD AUTO: 38.2 % (ref 35–47)
HCT VFR BLD AUTO: 38.2 % (ref 35–47)
HCV AB SERPL QL IA: NONREACTIVE
HCV AB SERPL QL IA: NONREACTIVE
HDLC SERPL-MCNC: 47 MG/DL
HDLC SERPL-MCNC: 47 MG/DL
HGB BLD-MCNC: 12.1 G/DL (ref 11.7–15.7)
HGB BLD-MCNC: 12.1 G/DL (ref 11.7–15.7)
HIV 1+2 AB+HIV1 P24 AG SERPL QL IA: NONREACTIVE
HIV 1+2 AB+HIV1 P24 AG SERPL QL IA: NONREACTIVE
LDLC SERPL CALC-MCNC: 90 MG/DL
LDLC SERPL CALC-MCNC: 90 MG/DL
MCH RBC QN AUTO: 28.6 PG (ref 26.5–33)
MCH RBC QN AUTO: 28.6 PG (ref 26.5–33)
MCHC RBC AUTO-ENTMCNC: 31.7 G/DL (ref 31.5–36.5)
MCHC RBC AUTO-ENTMCNC: 31.7 G/DL (ref 31.5–36.5)
MCV RBC AUTO: 90 FL (ref 78–100)
MCV RBC AUTO: 90 FL (ref 78–100)
NONHDLC SERPL-MCNC: 103 MG/DL
NONHDLC SERPL-MCNC: 103 MG/DL
PLATELET # BLD AUTO: 245 10E3/UL (ref 150–450)
PLATELET # BLD AUTO: 245 10E3/UL (ref 150–450)
POTASSIUM SERPL-SCNC: 4.4 MMOL/L (ref 3.4–5.3)
POTASSIUM SERPL-SCNC: 4.4 MMOL/L (ref 3.4–5.3)
PROT SERPL-MCNC: 7 G/DL (ref 6.4–8.3)
PROT SERPL-MCNC: 7 G/DL (ref 6.4–8.3)
RBC # BLD AUTO: 4.23 10E6/UL (ref 3.8–5.2)
RBC # BLD AUTO: 4.23 10E6/UL (ref 3.8–5.2)
SODIUM SERPL-SCNC: 141 MMOL/L (ref 135–145)
SODIUM SERPL-SCNC: 141 MMOL/L (ref 135–145)
T PALLIDUM AB SER QL: NONREACTIVE
T PALLIDUM AB SER QL: NONREACTIVE
TRIGL SERPL-MCNC: 67 MG/DL
TRIGL SERPL-MCNC: 67 MG/DL
TSH SERPL DL<=0.005 MIU/L-ACNC: 0.94 UIU/ML (ref 0.3–4.2)
TSH SERPL DL<=0.005 MIU/L-ACNC: 0.94 UIU/ML (ref 0.3–4.2)
WBC # BLD AUTO: 9 10E3/UL (ref 4–11)
WBC # BLD AUTO: 9 10E3/UL (ref 4–11)

## 2025-08-07 PROCEDURE — 86803 HEPATITIS C AB TEST: CPT | Mod: ORL | Performed by: OBSTETRICS & GYNECOLOGY

## 2025-08-07 PROCEDURE — 87389 HIV-1 AG W/HIV-1&-2 AB AG IA: CPT | Mod: ORL | Performed by: OBSTETRICS & GYNECOLOGY

## 2025-08-07 PROCEDURE — 85027 COMPLETE CBC AUTOMATED: CPT | Mod: ORL | Performed by: OBSTETRICS & GYNECOLOGY

## 2025-08-07 PROCEDURE — 83036 HEMOGLOBIN GLYCOSYLATED A1C: CPT | Mod: ORL | Performed by: OBSTETRICS & GYNECOLOGY

## 2025-08-07 PROCEDURE — 80061 LIPID PANEL: CPT | Mod: ORL | Performed by: OBSTETRICS & GYNECOLOGY

## 2025-08-07 PROCEDURE — 87491 CHLMYD TRACH DNA AMP PROBE: CPT | Mod: ORL | Performed by: OBSTETRICS & GYNECOLOGY

## 2025-08-07 PROCEDURE — G0145 SCR C/V CYTO,THINLAYER,RESCR: HCPCS | Mod: ORL | Performed by: OBSTETRICS & GYNECOLOGY

## 2025-08-07 PROCEDURE — 87340 HEPATITIS B SURFACE AG IA: CPT | Mod: ORL | Performed by: OBSTETRICS & GYNECOLOGY

## 2025-08-07 PROCEDURE — 86780 TREPONEMA PALLIDUM: CPT | Mod: ORL | Performed by: OBSTETRICS & GYNECOLOGY

## 2025-08-07 PROCEDURE — 80053 COMPREHEN METABOLIC PANEL: CPT | Mod: ORL | Performed by: OBSTETRICS & GYNECOLOGY

## 2025-08-07 PROCEDURE — 84443 ASSAY THYROID STIM HORMONE: CPT | Mod: ORL | Performed by: OBSTETRICS & GYNECOLOGY

## 2025-08-08 LAB
C TRACH DNA SPEC QL PROBE+SIG AMP: NEGATIVE
N GONORRHOEA DNA SPEC QL NAA+PROBE: NEGATIVE
SPECIMEN TYPE: NORMAL

## 2025-08-12 LAB
BKR LAB AP GYN ADEQUACY: NORMAL
BKR LAB AP GYN INTERPRETATION: NORMAL
BKR LAB AP HPV REFLEX: NORMAL
BKR LAB AP LMP: NORMAL
BKR LAB AP PREVIOUS ABNL DX: NORMAL
BKR LAB AP PREVIOUS ABNORMAL: NORMAL
PATH REPORT.COMMENTS IMP SPEC: NORMAL
PATH REPORT.COMMENTS IMP SPEC: NORMAL
PATH REPORT.RELEVANT HX SPEC: NORMAL

## (undated) DEVICE — SUTURE VICRYL 0 CT 36IN BRAID COAT ABS VIOL J358H

## (undated) DEVICE — SUTURE VICRYL 2-0 CT 27IN BRAID COAT ABS VIOL J351H

## (undated) DEVICE — STAPLER SKIN 4.1X6.5MM 35 WIDE STPL CRTDG LF APPOSE ULC DISP

## (undated) DEVICE — SUTURE MERS 5MM BP-1 12IN 2 ARM WOVEN NABSB WHT

## (undated) DEVICE — CUP MED TRNLU 1OZ NS DISP

## (undated) DEVICE — DEVICE SECUREMENT STLK TRCT FOLEY ANCH PAD STAB ADULT SIL

## (undated) DEVICE — ADHESIVE DRMBND ADV .7ML LIQUID APL MCBL BRR FLXB 2 OCTYL

## (undated) DEVICE — PAD ABD 10X8IN CELLULOSE ABS NONWOVEN SOFT HDRPHB BACK LF

## (undated) DEVICE — SUTURE 0 CT-3 27IN PLN MONO ABS YELLOWISH TAN N864H

## (undated) DEVICE — CATHETER BARDEX LBRCTH 16FR 30CC FOLEY 3W 2 STAGGER DRN EYE

## (undated) DEVICE — Device

## (undated) DEVICE — GLOVE SURG 7 PROTEXIS PI LF CRM PF BEAD CUFF STRL PLISPRN

## (undated) DEVICE — SUT POLYSORB 4-0 SC-2 SL644

## (undated) DEVICE — OIL EMULSION DRESSING 3X8 CURAD

## (undated) DEVICE — CLIPPER SURG RCHRG LED INDCTR LIGHT LF

## (undated) DEVICE — SOLUTION IRR 1000ML 0.9% NACL PVC FREE DEHP-FR NONDRIP POUR

## (undated) DEVICE — SOLUTION IV 1000ML 0.9% NACL PVC FREE DEHP-FR E3 LF

## (undated) DEVICE — HANDPIECE SCT MDVC YNKR STRGT CLR STRL LF DISP